# Patient Record
(demographics unavailable — no encounter records)

---

## 2024-11-11 NOTE — ASSESSMENT
[FreeTextEntry1] : Hypertension: Continue spironolactone therapy.  The patient will check her blood pressure cuff at home against the pharmacy and bring me a log of her readings.

## 2024-11-11 NOTE — REASON FOR VISIT
[FreeTextEntry1] : The patient is seen for follow-up of hypertension.  The patient had an allergic reaction.  She is uncertain as to whether it was caused by amlodipine or hydrochlorothiazide.  The reaction was a rash.  Now the patient is maintained on spironolactone.  Today her blood pressure systolic is 134.  Patient reports at home she believes she has been hypertensive but she is uncertain if her cuff is accurate.

## 2025-03-03 NOTE — REVIEW OF SYSTEMS
[Weight Gain (___ Lbs)] : [unfilled] ~Ulb weight gain [SOB] : no shortness of breath [Dyspnea on exertion] : not dyspnea during exertion [Chest Discomfort] : no chest discomfort [Lower Ext Edema] : no extremity edema [Leg Claudication] : no intermittent leg claudication [Palpitations] : no palpitations [Orthopnea] : no orthopnea [PND] : no PND [Syncope] : no syncope [Negative] : Psychiatric

## 2025-03-03 NOTE — REASON FOR VISIT
[CV Risk Factors and Non-Cardiac Disease] : CV risk factors and non-cardiac disease [Hypertension] : hypertension [FreeTextEntry3] : BATSHEVA KERN DO [FreeTextEntry1] : Patient is a 66-year-old female with known hypertension, hypothyroidism who presents back to the office today for a blood pressure check after discontinuing amlodipine she presents to the office today noting that for the most part she feels well her blood pressure is modestly elevated running between 176/78 and 180/86.  She denies headaches, dizziness or lightheadedness her weight is at 160 pounds was 157 at her visit in November with Dr. Shepherd.

## 2025-03-03 NOTE — HISTORY OF PRESENT ILLNESS
[FreeTextEntry1] : Patient is a 66-year-old female recently seen in this office in May 2024.  At that time she suspected she developed hypertension her 60s mother had hypertension her 70s.  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  Patient noted that she is without dyspnea on exertion or exertional chest discomfort, only family history was that of diabetes and hypertension.  She is a non-smoker and when she first presented she was on amlodipine 10 levothyroxine 50 mcg and spironolactone 25.  She presents today noting that she is no longer taking amlodipine at that time of her visit and November 2024 blood pressure was 134/82.  At the time of that original visit her hydrochlorothiazide was increased to 50 mg/day and there was concerns over possibly having sleep apnea which was not followed up upon to my knowledge  Patient presents today feeling generally well, has not lost weight he is not doing strenuous activity in the winter months but has no new or active cardiac symptomatology.  There is no data regarding previous echocardiography or stress testing and her lipids in the past were notable for cholesterol of 199 and HDL of 73 with a hemoglobin A1c of 5.7.  Previous electrolytes notable for potassium of 3.4 but then was placed on spironolactone and now off hydrochlorothiazide.  He is having no problems with spironolactone.  There was a possible allergic reaction and hydrochlorothiazide and amlodipine were discontinued

## 2025-03-03 NOTE — DISCUSSION/SUMMARY
[FreeTextEntry1] : Patient very pleasant 66-year-old female who is mildly overweight longstanding history of hypertension less than ideally controlled at today's visit who has not had any further workup regarding structural effects related to longstanding hypertension.  At this point in time patient was recommended to reimplement amlodipine 2.5 mg twice daily.  I suspect her rash was related to sulfide her group of hydrochlorothiazide.  She should remain on spironolactone, restrict salt increase her commitment to exercise and weight loss and follow-up for an echocardiogram.  Patient was informed of the structural changes on echocardiography that are consistent with hypertensive heart disease and once that is obtained we will likely obtain a stress test to evaluate whether or not there is any evidence of ischemic heart disease and is at risk postmenopausal female and to see that her blood pressure response is normal both at rest and with exertion.  No other changes were made to her medical regimen today.  Her blood pressure peaked today at 164/90 and she understands the rationale behind adding back amlodipine  Patient will return to the office in next several weeks for updated echocardiogram blood pressure check.  Joel Goldberg, MD, FACC

## 2025-03-03 NOTE — PHYSICAL EXAM
[Well Developed] : well developed [Well Nourished] : well nourished [No Acute Distress] : no acute distress [Normal S1, S2] : normal S1, S2 [No Murmur] : no murmur [Normal] : normal gait [No Edema] : no edema [No Cyanosis] : no cyanosis [No Clubbing] : no clubbing [de-identified] : No bruits no palpable thyroid [de-identified] : Possible S4 gallop

## 2025-04-25 NOTE — DISCUSSION/SUMMARY
[FreeTextEntry1] : Patient very pleasant 66-year-old female who is mildly overweight with a longstanding history of hypertension now in acceptable range since adding amlodipine which she is tolerating well.  She presents today to review an echocardiogram (see cardiology summary) which failed to reveal any evidence of hypertensive heart disease, mild sclerotic changes on a 3 with aortic valve otherwise normal study.  Patient presents today after having reimplemented amlodipine.  Her blood pressure is quite acceptable she looks and feels well she has no restrictions and was reassured regarding her cardiovascular status.  Patient was advised to implement a no white food diet limiting carbohydrates including pasta rice potatoes and bread limiting alcohol and recommended to take 2 days a week where she is not working 2 jobs and implement a 50-minute walking program.  Patient was reassured regarding her cardiac findings and response to therapy.  She return back to the office in 10 weeks for weight and blood pressure check and a plain stress test to document that her blood pressure is controlled not just at rest but with exertion as well.  Otherwise no changes were made to her medical regimen, she was reassured regarding her blood work.  Joel Goldberg, MD, FACC

## 2025-04-25 NOTE — PHYSICAL EXAM
[Well Developed] : well developed [Well Nourished] : well nourished [No Acute Distress] : no acute distress [Normal S1, S2] : normal S1, S2 [No Murmur] : no murmur [Normal] : normal gait [No Edema] : no edema [No Cyanosis] : no cyanosis [No Clubbing] : no clubbing [de-identified] : No bruits no palpable thyroid [de-identified] : Possible S4 gallop

## 2025-04-25 NOTE — CARDIOLOGY SUMMARY
[de-identified] : (2/28/2025) EKG: NSR at 66 beats minute with a frontal QRS axis of +15 degrees; poor progression of uncertain significance.  Left atrial enlargement. [de-identified] : (4/25/2025) ECHOCARDIOGRAPHIC CONCLUSIONS:   CONCLUSIONS:  1. Left ventricular systolic function is normal with an ejection fraction visually estimated at 60 to 65 %. 2. Normal left ventricular diastolic function. 3. Trace mitral regurgitation. 4. Trace tricuspid regurgitation. 5. Estimated pulmonary artery systolic pressure is 27 mmHg. 6. No pericardial effusion seen. 7. No prior echocardiogram is available for comparison.

## 2025-04-25 NOTE — HISTORY OF PRESENT ILLNESS
[FreeTextEntry1] : Patient is a 66-year-old female originally seen by me in February 2025.  At that time she noted that she is suspected that she developed hypertension her 60s mother had hypertension her 70s.  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  Patient noted that she is without dyspnea on exertion or exertional chest discomfort, only family history was that of diabetes and hypertension.  She is a non-smoker and when she first presented she was on amlodipine 10 levothyroxine 50 mcg and spironolactone 25.  She presents today noting that she is no longer taking amlodipine which was discontinued at the same time as a diuretic for a rash.  Her blood pressure has been high since that time, her amlodipine was reimplemented diuretic had been changed to spironolactone and her rash at this period completely.  Patient is active but she does not exercise on a regular basis that she works 2 jobs.  She looks and feels well today, is taking and tolerating her amlodipine and her blood pressure is acceptable.  Echocardiography revealed normal LV function, normal diastolic function no LVH mildly thickened 3 leaflet aortic valve with no AAS or AI.  Patient presents today feeling generally well, has not lost weight he is not doing strenuous activity in the winter months but has no new or active cardiac symptomatology.  At the time of her original visit her lipids in the past were notable for cholesterol of 199 and HDL of 73 with a hemoglobin A1c of 5.7.  Blood work obtained prior to today's visit on April 23 was notable for a normal vitamin D normal B12 normal folate normal CBC normal comprehensive metabolic panel with a potassium of 4.2 normal LFTs cholesterol of 201 HDL 71 triglycerides 80  hemoglobin A1c 5.7.

## 2025-04-25 NOTE — REVIEW OF SYSTEMS
[Negative] : Psychiatric [Weight Gain (___ Lbs)] : no recent weight gain [Weight Loss (___ Lbs)] : no recent weight loss [SOB] : no shortness of breath [Dyspnea on exertion] : not dyspnea during exertion [Chest Discomfort] : no chest discomfort [Lower Ext Edema] : no extremity edema [Leg Claudication] : no intermittent leg claudication [Palpitations] : no palpitations [Orthopnea] : no orthopnea [PND] : no PND [Syncope] : no syncope

## 2025-04-25 NOTE — REASON FOR VISIT
[CV Risk Factors and Non-Cardiac Disease] : CV risk factors and non-cardiac disease [Hypertension] : hypertension [FreeTextEntry3] : BATSHEVA KERN DO [FreeTextEntry1] : Patient is a 66-year-old female with known hypertension and hypothyroidism who presents back to the office today for an echocardiogram and to follow-up with her blood pressure after having re-implemented amlodipine.  She developed a rash and her medications previously were discontinued but at the time of her last visit it was suggested that she reimplemented as her diuretic had been discontinued which was the more likely culprit giving her rash.  She presents today for a brief interval follow-up, updated echo to evaluate whether or not high blood pressure could contribute to any significant structural changes which fortunately it has not and to discuss diet and exercise regimen.  She presents the office today looking and feeling well.  Her blood pressure office today noting that for the most part she feels well her blood pressure is quite acceptable at 142/78, she is taking and tolerating the amlodipine without issue and her echocardiogram fortunately failed to reveal any significant changes of hypertensive heart disease.  Her LA size was normal by AP dimension volume, there was notes of diastolic dysfunction and no LVH.  Her blood pressure before implementing these changes was 180/86

## 2025-07-10 NOTE — DISCUSSION/SUMMARY
[FreeTextEntry1] : Patient very pleasant 67-year-old female who is mildly overweight with a longstanding history of hypertension now in acceptable range since adding amlodipine which she is tolerating well.  She presents today for stress testing that is negative for exercise-induced arrhythmias, she had a normal blood pressure response on her medications, she was asymptomatic and her EKG was without evidence of provokable ischemia.  Previously her echocardiogram (see cardiology summary) failed to reveal any evidence of hypertensive heart disease, mild sclerotic changes on a 3 with aortic valve otherwise normal study.  Patient presents today after having reimplemented amlodipine.  Her blood pressure is quite acceptable she looks and feels well she has no restrictions and was reassured regarding her cardiovascular status.  Patient was once again advised to implement a no white food diet limiting carbohydrates including pasta rice potatoes and bread limiting alcohol and recommended to take 2 days a week where she is not working 2 jobs and implement a 50-minute walking program.  Patient was reassured regarding her cardiac findings and response to therapy.  She return back to the office in 3 to 4 months weeks for weight and blood pressure check and updated blood work.  She was reassured by her stress test findings today, no limitations will be placed on her activity and no changes made to her medical regimen at this point in time.  Joel Goldberg, MD, FACC

## 2025-07-10 NOTE — CARDIOLOGY SUMMARY
[de-identified] : (2/28/2025) EKG: NSR at 66 beats minute with a frontal QRS axis of +15 degrees; poor progression of uncertain significance.  Left atrial enlargement. [de-identified] : (7/9/2025) STRESS TEST CONCLUSIONS: 1. Baseline electrocardiogram: normal sinus rhythm at a rate of 71 bpm with nonspecific ST-T wave abnormalities. 2. The patient underwent stress testing using the standard Javier protocol. _ The patient exercised for 6 min 0 sec. _ The test was stopped due to target heart rate achieved, patient request and maximum exertion effort. _ The peak heart rate was 151 bpm; 99 % of predicted maximal heart rate for this patient. _ The patient achieved 7.1 METS which is consistent with average exercise capacity. 3. The Duke Treadmill Score is 1.00, which is consistent with intermediate risk (-10 to <5) for future cardiac events. 4. Electrocardiogram ischemic changes: 0.5-1mm upsloping ST-segment depressions inferiorly. 0.5-1mm upsloping ST-segment depressions anterolaterally. 5. The ECG is negative for ischemia.  ---------------------------------------------------------------------------------------------------------------------------------------------------------  Stress Test Results: Protocol: Standard Javier METS Achieved: 7.1 Stage Reached: 2 Exercise Duration: 6 min and 0 sec. Heart Rate: Resting 71 bpm, Stress peak 151 bpm (99 % max predicted) HR Response: Normal. Blood Pressure: Resting 122/78 mmHg, Stress max 194/76 mmHg BP Response: Normal. Exercise Capacity: Average. Pretest Chest Pain: None. Stress Test Chest Pain: no chest pain during exercise Symptoms During Stress: No symptoms. Reason for Termination: Target heart rate achieved, patient request and maximum exertion effort. Duke Treadmill Score: intermediate risk   +--------------+--------------+--------------+---------+-----------+  Treadmill Time   Treadmill      Treadmill      Heart      Blood                  Speed (mph)       Grade        Rate     Pressure                                                 (bpm)     (mmHg) +--------------+--------------+--------------+---------+-----------+     Baseline       Standing         0.0%         71       122/78 +--------------+--------------+--------------+---------+-----------+     3:00 min         1.7            10%          135      168/72 +--------------+--------------+--------------+---------+-----------+     6:00 min         2.5            12%          151      194/76 +--------------+--------------+--------------+---------+-----------+   Recovery :21  -------------- --------------    147      192/70       min +--------------+--------------+--------------+---------+-----------+  Recovery 3:00  -------------- --------------    107      168/70       min +--------------+--------------+--------------+---------+-----------+  Recovery 5:00  -------------- --------------    96       142/68       min +--------------+--------------+--------------+---------+-----------+  Recovery 7:00  -------------- --------------    93       136/68       min +--------------+--------------+--------------+---------+-----------+  ---------------------------------------------------------------------------------------------------------------------------------------------------------Electrocardiogram: Baseline electrocardiogram: Normal sinus rhythm at a rate of 71 bpm with nonspecific ST-T wave abnormalities. Stress electrocardiogram: 0.5-1mm horizontal to upsloping ST-segment depressions inferiorly. 0.5-1mm upsloping ST-segment depressions anterolaterally. Arrhythmias: Rare PVC during recovery.   Heart rate and blood pressure: The heart rate response was normal. The blood pressure response was normal.  Exercise Capacity: The patient achieved 7.1 METS, which is consistent with average exercise capacity.  Stress Supervising Provider: Electronically signed by Valentina Bower RN Interpreting Provider: Electronically signed on 7/10/2025 at 10:23:32 AM by [id number] Fidencio Phillip   [de-identified] : (4/25/2025) ECHOCARDIOGRAPHIC CONCLUSIONS:   CONCLUSIONS:  1. Left ventricular systolic function is normal with an ejection fraction visually estimated at 60 to 65 %. 2. Normal left ventricular diastolic function. 3. Trace mitral regurgitation. 4. Trace tricuspid regurgitation. 5. Estimated pulmonary artery systolic pressure is 27 mmHg. 6. No pericardial effusion seen. 7. No prior echocardiogram is available for comparison.

## 2025-07-10 NOTE — CARDIOLOGY SUMMARY
[de-identified] : (2/28/2025) EKG: NSR at 66 beats minute with a frontal QRS axis of +15 degrees; poor progression of uncertain significance.  Left atrial enlargement. [de-identified] : (7/9/2025) STRESS TEST CONCLUSIONS: 1. Baseline electrocardiogram: normal sinus rhythm at a rate of 71 bpm with nonspecific ST-T wave abnormalities. 2. The patient underwent stress testing using the standard Javier protocol. _ The patient exercised for 6 min 0 sec. _ The test was stopped due to target heart rate achieved, patient request and maximum exertion effort. _ The peak heart rate was 151 bpm; 99 % of predicted maximal heart rate for this patient. _ The patient achieved 7.1 METS which is consistent with average exercise capacity. 3. The Duke Treadmill Score is 1.00, which is consistent with intermediate risk (-10 to <5) for future cardiac events. 4. Electrocardiogram ischemic changes: 0.5-1mm upsloping ST-segment depressions inferiorly. 0.5-1mm upsloping ST-segment depressions anterolaterally. 5. The ECG is negative for ischemia.  ---------------------------------------------------------------------------------------------------------------------------------------------------------  Stress Test Results: Protocol: Standard Javier METS Achieved: 7.1 Stage Reached: 2 Exercise Duration: 6 min and 0 sec. Heart Rate: Resting 71 bpm, Stress peak 151 bpm (99 % max predicted) HR Response: Normal. Blood Pressure: Resting 122/78 mmHg, Stress max 194/76 mmHg BP Response: Normal. Exercise Capacity: Average. Pretest Chest Pain: None. Stress Test Chest Pain: no chest pain during exercise Symptoms During Stress: No symptoms. Reason for Termination: Target heart rate achieved, patient request and maximum exertion effort. Duke Treadmill Score: intermediate risk   +--------------+--------------+--------------+---------+-----------+  Treadmill Time   Treadmill      Treadmill      Heart      Blood                  Speed (mph)       Grade        Rate     Pressure                                                 (bpm)     (mmHg) +--------------+--------------+--------------+---------+-----------+     Baseline       Standing         0.0%         71       122/78 +--------------+--------------+--------------+---------+-----------+     3:00 min         1.7            10%          135      168/72 +--------------+--------------+--------------+---------+-----------+     6:00 min         2.5            12%          151      194/76 +--------------+--------------+--------------+---------+-----------+   Recovery :21  -------------- --------------    147      192/70       min +--------------+--------------+--------------+---------+-----------+  Recovery 3:00  -------------- --------------    107      168/70       min +--------------+--------------+--------------+---------+-----------+  Recovery 5:00  -------------- --------------    96       142/68       min +--------------+--------------+--------------+---------+-----------+  Recovery 7:00  -------------- --------------    93       136/68       min +--------------+--------------+--------------+---------+-----------+  ---------------------------------------------------------------------------------------------------------------------------------------------------------Electrocardiogram: Baseline electrocardiogram: Normal sinus rhythm at a rate of 71 bpm with nonspecific ST-T wave abnormalities. Stress electrocardiogram: 0.5-1mm horizontal to upsloping ST-segment depressions inferiorly. 0.5-1mm upsloping ST-segment depressions anterolaterally. Arrhythmias: Rare PVC during recovery.   Heart rate and blood pressure: The heart rate response was normal. The blood pressure response was normal.  Exercise Capacity: The patient achieved 7.1 METS, which is consistent with average exercise capacity.  Stress Supervising Provider: Electronically signed by Valentina Bower RN Interpreting Provider: Electronically signed on 7/10/2025 at 10:23:32 AM by [id number] Fidencio Phillip   [de-identified] : (4/25/2025) ECHOCARDIOGRAPHIC CONCLUSIONS:   CONCLUSIONS:  1. Left ventricular systolic function is normal with an ejection fraction visually estimated at 60 to 65 %. 2. Normal left ventricular diastolic function. 3. Trace mitral regurgitation. 4. Trace tricuspid regurgitation. 5. Estimated pulmonary artery systolic pressure is 27 mmHg. 6. No pericardial effusion seen. 7. No prior echocardiogram is available for comparison.

## 2025-07-10 NOTE — REASON FOR VISIT
[CV Risk Factors and Non-Cardiac Disease] : CV risk factors and non-cardiac disease [Hypertension] : hypertension [FreeTextEntry3] : BATSHEVA KERN DO [FreeTextEntry1] : Patient is a 67-year-old female with known hypertension and hypothyroidism who presents to the office today to review her stress test and for interval follow-up.  Patient exercise consistent Javier protocol for total of 6 minutes achieving a heart rate of 147.  She had an appropriate blood pressure response no significant or sustained exercise-induced arrhythmias no diagnostic EKG changes and the test was negative for ischemia and she was without symptomatology.  When she last was in the office it was to review her echocardiogram and to follow-up with her blood pressure after having re-implemented amlodipine.  She previously had developed a rash and her medications  were discontinued but at the time of her last visit it was suggested that she reimplement the amlodipine and it was my opinion it was more likely as her diuretic was the likely agent  giving her rash.  She presents today for interval follow-up and for stress test and this test was performed on her medication and she had a totally appropriate blood pressure response, she is tolerating the medication now well without any issues.en.  She presents the office today looking and feeling well.  Her blood pressure office today noting that for the most part she feels well her blood pressure is quite acceptable at 122/78., she is taking and tolerating the amlodipine without issue and her echocardiogram fortunately failed to reveal any significant changes of hypertensive heart disease.  Her LA size was normal by AP dimension volume, there was notes of diastolic dysfunction and no LVH.  Her blood pressure before implementing these changes was 180/86

## 2025-07-10 NOTE — PHYSICAL EXAM
[Well Developed] : well developed [Well Nourished] : well nourished [No Acute Distress] : no acute distress [Normal S1, S2] : normal S1, S2 [No Murmur] : no murmur [Normal] : normal gait [No Edema] : no edema [No Cyanosis] : no cyanosis [No Clubbing] : no clubbing [de-identified] : No bruits no palpable thyroid [de-identified] : Possible S4 gallop

## 2025-07-10 NOTE — HISTORY OF PRESENT ILLNESS
[FreeTextEntry1] : Patient is a 67-year-old female originally seen by me in February 2025.  At that time she noted that she was suspected that she developed hypertension her 60s mother had hypertension her 70s.  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  Patient noted that she is without dyspnea on exertion or exertional chest discomfort, only family history was that of diabetes and hypertension.  She is a non-smoker and when she first presented she was on amlodipine 10 levothyroxine 50 mcg and spironolactone 25.  She presented initially noting that she was no longer taking amlodipine which was discontinued at the same time as a diuretic for a rash.  Her blood pressure has been high since that time, her amlodipine was reimplemented diuretic had been changed to spironolactone and her rash had disappeared completely.  Patient is active but she does not exercise on a regular basis that she works 2 jobs.  She looks and feels well today, is taking and tolerating her amlodipine and her blood pressure is acceptable.  Echocardiography revealed normal LV function, normal diastolic function no LVH mildly thickened 3 leaflet aortic valve with no AS or AI.  Patient presents today feeling generally well, her stress test was negative for ischemia, exercise-induced arrhythmias and or hypertensive response and she was asymptomatic.  Has not lost weight he is not doing strenuous activity and has no new or active cardiac symptomatology.    At the time of her original visit her lipids in the past were notable for cholesterol of 199 and HDL of 73 with a hemoglobin A1c of 5.7.  Blood work obtained prior to today's visit on April 23 was notable for a normal vitamin D normal B12 normal folate normal CBC normal comprehensive metabolic panel with a potassium of 4.2 normal LFTs cholesterol of 201 HDL 71 triglycerides 80  hemoglobin A1c 5.7.  She notes she is working harder at making time to exercise and becoming more conscientious on her diet.

## 2025-07-10 NOTE — PHYSICAL EXAM
[Well Developed] : well developed [Well Nourished] : well nourished [No Acute Distress] : no acute distress [Normal S1, S2] : normal S1, S2 [No Murmur] : no murmur [Normal] : normal gait [No Edema] : no edema [No Cyanosis] : no cyanosis [No Clubbing] : no clubbing [de-identified] : No bruits no palpable thyroid [de-identified] : Possible S4 gallop